# Patient Record
Sex: MALE | Race: WHITE | NOT HISPANIC OR LATINO | Employment: OTHER | URBAN - METROPOLITAN AREA
[De-identification: names, ages, dates, MRNs, and addresses within clinical notes are randomized per-mention and may not be internally consistent; named-entity substitution may affect disease eponyms.]

---

## 2018-02-01 ENCOUNTER — HOSPITAL ENCOUNTER (OUTPATIENT)
Dept: CARDIOLOGY | Facility: HOSPITAL | Age: 73
Discharge: HOME | End: 2018-02-01
Admitting: INTERNAL MEDICINE

## 2018-02-01 ENCOUNTER — APPOINTMENT (OUTPATIENT)
Dept: GENERAL RADIOLOGY | Facility: HOSPITAL | Age: 73
End: 2018-02-01

## 2018-02-01 ENCOUNTER — HOSPITAL ENCOUNTER (EMERGENCY)
Facility: HOSPITAL | Age: 73
Discharge: HOME | End: 2018-02-01
Attending: EMERGENCY MEDICINE | Admitting: EMERGENCY MEDICINE

## 2018-02-01 VITALS
SYSTOLIC BLOOD PRESSURE: 125 MMHG | RESPIRATION RATE: 16 BRPM | BODY MASS INDEX: 33.36 KG/M2 | OXYGEN SATURATION: 95 % | DIASTOLIC BLOOD PRESSURE: 76 MMHG | WEIGHT: 233 LBS | HEIGHT: 70 IN | HEART RATE: 64 BPM

## 2018-02-01 VITALS
HEIGHT: 70 IN | WEIGHT: 233 LBS | TEMPERATURE: 97.8 F | SYSTOLIC BLOOD PRESSURE: 121 MMHG | OXYGEN SATURATION: 92 % | DIASTOLIC BLOOD PRESSURE: 76 MMHG | HEART RATE: 68 BPM | BODY MASS INDEX: 33.36 KG/M2 | RESPIRATION RATE: 16 BRPM

## 2018-02-01 DIAGNOSIS — N28.9 RENAL INSUFFICIENCY: ICD-10-CM

## 2018-02-01 DIAGNOSIS — R06.09 DOE (DYSPNEA ON EXERTION): Primary | ICD-10-CM

## 2018-02-01 DIAGNOSIS — E78.5 HYPERLIPIDEMIA, UNSPECIFIED HYPERLIPIDEMIA TYPE: ICD-10-CM

## 2018-02-01 DIAGNOSIS — R06.02 SHORTNESS OF BREATH: Primary | ICD-10-CM

## 2018-02-01 DIAGNOSIS — E11.8 TYPE 2 DIABETES MELLITUS WITH COMPLICATION, WITHOUT LONG-TERM CURRENT USE OF INSULIN (HCC): ICD-10-CM

## 2018-02-01 DIAGNOSIS — R07.9 CHEST PAIN, UNSPECIFIED TYPE: ICD-10-CM

## 2018-02-01 DIAGNOSIS — R00.2 PALPITATIONS: ICD-10-CM

## 2018-02-01 DIAGNOSIS — I10 ESSENTIAL HYPERTENSION: ICD-10-CM

## 2018-02-01 LAB
ALBUMIN SERPL-MCNC: 4.4 G/DL (ref 3.5–5.2)
ALBUMIN/GLOB SERPL: 1.4 G/DL
ALP SERPL-CCNC: 81 U/L (ref 39–117)
ALT SERPL W P-5'-P-CCNC: 29 U/L (ref 1–41)
ANION GAP SERPL CALCULATED.3IONS-SCNC: 15.8 MMOL/L
AST SERPL-CCNC: 28 U/L (ref 1–40)
BASOPHILS # BLD AUTO: 0.02 10*3/MM3 (ref 0–0.2)
BASOPHILS NFR BLD AUTO: 0.3 % (ref 0–1.5)
BILIRUB SERPL-MCNC: 0.5 MG/DL (ref 0.1–1.2)
BUN BLD-MCNC: 28 MG/DL (ref 8–23)
BUN/CREAT SERPL: 19.3 (ref 7–25)
CALCIUM SPEC-SCNC: 9.4 MG/DL (ref 8.6–10.5)
CHLORIDE SERPL-SCNC: 102 MMOL/L (ref 98–107)
CO2 SERPL-SCNC: 22.2 MMOL/L (ref 22–29)
CREAT BLD-MCNC: 1.45 MG/DL (ref 0.76–1.27)
D DIMER PPP FEU-MCNC: 0.43 MCGFEU/ML (ref 0–0.49)
DEPRECATED RDW RBC AUTO: 45 FL (ref 37–54)
EOSINOPHIL # BLD AUTO: 0.13 10*3/MM3 (ref 0–0.7)
EOSINOPHIL NFR BLD AUTO: 2 % (ref 0.3–6.2)
ERYTHROCYTE [DISTWIDTH] IN BLOOD BY AUTOMATED COUNT: 13 % (ref 11.5–14.5)
GFR SERPL CREATININE-BSD FRML MDRD: 48 ML/MIN/1.73
GLOBULIN UR ELPH-MCNC: 3.1 GM/DL
GLUCOSE BLD-MCNC: 119 MG/DL (ref 65–99)
HCT VFR BLD AUTO: 44.8 % (ref 40.4–52.2)
HGB BLD-MCNC: 15 G/DL (ref 13.7–17.6)
HOLD SPECIMEN: NORMAL
HOLD SPECIMEN: NORMAL
IMM GRANULOCYTES # BLD: 0 10*3/MM3 (ref 0–0.03)
IMM GRANULOCYTES NFR BLD: 0 % (ref 0–0.5)
INR PPP: 1.09 (ref 0.9–1.1)
LYMPHOCYTES # BLD AUTO: 1.97 10*3/MM3 (ref 0.9–4.8)
LYMPHOCYTES NFR BLD AUTO: 29.9 % (ref 19.6–45.3)
MCH RBC QN AUTO: 31.6 PG (ref 27–32.7)
MCHC RBC AUTO-ENTMCNC: 33.5 G/DL (ref 32.6–36.4)
MCV RBC AUTO: 94.5 FL (ref 79.8–96.2)
MONOCYTES # BLD AUTO: 0.6 10*3/MM3 (ref 0.2–1.2)
MONOCYTES NFR BLD AUTO: 9.1 % (ref 5–12)
NEUTROPHILS # BLD AUTO: 3.87 10*3/MM3 (ref 1.9–8.1)
NEUTROPHILS NFR BLD AUTO: 58.7 % (ref 42.7–76)
NT-PROBNP SERPL-MCNC: 54 PG/ML (ref 0–900)
PLATELET # BLD AUTO: 188 10*3/MM3 (ref 140–500)
PMV BLD AUTO: 10.8 FL (ref 6–12)
POTASSIUM BLD-SCNC: 4.1 MMOL/L (ref 3.5–5.2)
PROT SERPL-MCNC: 7.5 G/DL (ref 6–8.5)
PROTHROMBIN TIME: 13.7 SECONDS (ref 11.7–14.2)
RBC # BLD AUTO: 4.74 10*6/MM3 (ref 4.6–6)
SODIUM BLD-SCNC: 140 MMOL/L (ref 136–145)
TROPONIN T SERPL-MCNC: <0.01 NG/ML (ref 0–0.03)
WBC NRBC COR # BLD: 6.59 10*3/MM3 (ref 4.5–10.7)
WHOLE BLOOD HOLD SPECIMEN: NORMAL
WHOLE BLOOD HOLD SPECIMEN: NORMAL

## 2018-02-01 PROCEDURE — 93005 ELECTROCARDIOGRAM TRACING: CPT | Performed by: NURSE PRACTITIONER

## 2018-02-01 PROCEDURE — 85025 COMPLETE CBC W/AUTO DIFF WBC: CPT | Performed by: EMERGENCY MEDICINE

## 2018-02-01 PROCEDURE — 93010 ELECTROCARDIOGRAM REPORT: CPT | Performed by: NURSE PRACTITIONER

## 2018-02-01 PROCEDURE — 85610 PROTHROMBIN TIME: CPT | Performed by: EMERGENCY MEDICINE

## 2018-02-01 PROCEDURE — 93005 ELECTROCARDIOGRAM TRACING: CPT

## 2018-02-01 PROCEDURE — 85379 FIBRIN DEGRADATION QUANT: CPT | Performed by: NURSE PRACTITIONER

## 2018-02-01 PROCEDURE — 71046 X-RAY EXAM CHEST 2 VIEWS: CPT

## 2018-02-01 PROCEDURE — 80053 COMPREHEN METABOLIC PANEL: CPT | Performed by: EMERGENCY MEDICINE

## 2018-02-01 PROCEDURE — 83880 ASSAY OF NATRIURETIC PEPTIDE: CPT | Performed by: EMERGENCY MEDICINE

## 2018-02-01 PROCEDURE — 99204 OFFICE O/P NEW MOD 45 MIN: CPT | Performed by: NURSE PRACTITIONER

## 2018-02-01 PROCEDURE — 94760 N-INVAS EAR/PLS OXIMETRY 1: CPT

## 2018-02-01 PROCEDURE — 93010 ELECTROCARDIOGRAM REPORT: CPT | Performed by: INTERNAL MEDICINE

## 2018-02-01 PROCEDURE — 99284 EMERGENCY DEPT VISIT MOD MDM: CPT

## 2018-02-01 PROCEDURE — 84484 ASSAY OF TROPONIN QUANT: CPT | Performed by: EMERGENCY MEDICINE

## 2018-02-01 RX ORDER — MEMANTINE HYDROCHLORIDE 10 MG/1
10 TABLET ORAL 2 TIMES DAILY
COMMUNITY

## 2018-02-01 RX ORDER — NITROGLYCERIN 0.4 MG/1
0.4 TABLET SUBLINGUAL
Status: DISCONTINUED | OUTPATIENT
Start: 2018-02-01 | End: 2018-02-01

## 2018-02-01 RX ORDER — SODIUM CHLORIDE 0.9 % (FLUSH) 0.9 %
10 SYRINGE (ML) INJECTION AS NEEDED
Status: DISCONTINUED | OUTPATIENT
Start: 2018-02-01 | End: 2018-02-01 | Stop reason: HOSPADM

## 2018-02-01 RX ORDER — ASPIRIN 325 MG
325 TABLET ORAL ONCE
Status: COMPLETED | OUTPATIENT
Start: 2018-02-01 | End: 2018-02-01

## 2018-02-01 RX ORDER — SODIUM CHLORIDE 0.9 % (FLUSH) 0.9 %
10 SYRINGE (ML) INJECTION AS NEEDED
Status: DISCONTINUED | OUTPATIENT
Start: 2018-02-01 | End: 2018-02-01

## 2018-02-01 RX ORDER — TRAZODONE HYDROCHLORIDE 50 MG/1
50 TABLET ORAL NIGHTLY
COMMUNITY

## 2018-02-01 RX ORDER — ATORVASTATIN CALCIUM 40 MG/1
40 TABLET, FILM COATED ORAL NIGHTLY
COMMUNITY

## 2018-02-01 RX ORDER — FLUOXETINE HYDROCHLORIDE 40 MG/1
40 CAPSULE ORAL DAILY
COMMUNITY

## 2018-02-01 RX ORDER — BUSPIRONE HYDROCHLORIDE 10 MG/1
10 TABLET ORAL 2 TIMES DAILY
COMMUNITY

## 2018-02-01 RX ORDER — AMLODIPINE BESYLATE 10 MG/1
10 TABLET ORAL DAILY
COMMUNITY

## 2018-02-01 RX ORDER — CANDESARTAN 32 MG/1
32 TABLET ORAL DAILY
COMMUNITY

## 2018-02-01 RX ADMIN — ASPIRIN 325 MG: 325 TABLET ORAL at 11:35

## 2018-02-01 NOTE — ED PROVIDER NOTES
EMERGENCY DEPARTMENT ENCOUNTER    CHIEF COMPLAINT  Chief Complaint: Abnormal EKG  History given by: patient  History limited by: nothing  Room Number: 36/36  PMD: CHAYO Kelly      HPI:  Pt is a 72 y.o. male who presents complaining of BLE edema, SOA, fatigue and palpitations for the last month. Pt also complains of intermittent, sharp, right sided CP but denies having any CP today. Pt states that his BLE edema is worse as the day progresses and that his SOA is worse with exertion. Pt denies vision issues, fever, cough. Pt denies a history of cardiac or pulmonary disease. Pt was sent to the ED from his PMD's office for further evaluation    Duration:  1 month  Onset: gradual  Timing: constant  Quality: ARAYA  Intensity/Severity: moderate  Progression: stable  Associated Symptoms: CP, leg swelling, fatigue, palpitations  Aggravating Factors: exertion  Alleviating Factors: none  Previous Episodes: none  Treatment before arrival:  Pt was sent to the ED from his PMD's office for further evaluation    PAST MEDICAL HISTORY  Active Ambulatory Problems     Diagnosis Date Noted   • No Active Ambulatory Problems     Resolved Ambulatory Problems     Diagnosis Date Noted   • No Resolved Ambulatory Problems     Past Medical History:   Diagnosis Date   • Anxiety    • BPH (benign prostatic hyperplasia)    • Cataract    • Dementia    • Depression    • Diabetes mellitus    • Hyperlipidemia    • Hypertension    • Insomnia    • Nocturia    • Renal disorder    • Retinopathy due to secondary diabetes    • Vitamin D deficiency        PAST SURGICAL HISTORY  Past Surgical History:   Procedure Laterality Date   • BONE GRAFT PELVIS ILIAC CREST     • CERVICAL LAMINECTOMY     • PROSTATE SURGERY         FAMILY HISTORY  History reviewed. No pertinent family history.    SOCIAL HISTORY  Social History     Social History   • Marital status: Single     Spouse name: N/A   • Number of children: N/A   • Years of education: N/A     Occupational  History   • Not on file.     Social History Main Topics   • Smoking status: Never Smoker   • Smokeless tobacco: Not on file   • Alcohol use Yes      Comment: SELDOM   • Drug use: No   • Sexual activity: Not on file     Other Topics Concern   • Not on file     Social History Narrative   • No narrative on file       ALLERGIES  Review of patient's allergies indicates no known allergies.    REVIEW OF SYSTEMS  Review of Systems   Constitutional: Positive for fatigue. Negative for activity change, appetite change and fever.   HENT: Negative for congestion and sore throat.    Eyes: Negative.    Respiratory: Positive for shortness of breath (ARAYA). Negative for cough.    Cardiovascular: Positive for chest pain, palpitations and leg swelling (BLE).   Gastrointestinal: Negative for abdominal pain, diarrhea and vomiting.   Endocrine: Negative.    Genitourinary: Negative for decreased urine volume and dysuria.   Musculoskeletal: Negative for neck pain.   Skin: Negative for rash and wound.   Allergic/Immunologic: Negative.    Neurological: Negative for weakness, numbness and headaches.   Hematological: Negative.    Psychiatric/Behavioral: Negative.    All other systems reviewed and are negative.      PHYSICAL EXAM  ED Triage Vitals   Temp Heart Rate Resp BP SpO2   02/01/18 1037 02/01/18 1037 02/01/18 1037 02/01/18 1059 02/01/18 1037   98.5 °F (36.9 °C) 84 15 123/80 97 %      Temp src Heart Rate Source Patient Position BP Location FiO2 (%)   02/01/18 1037 -- -- -- --   Tympanic           Physical Exam   Constitutional: He is oriented to person, place, and time and well-developed, well-nourished, and in no distress.   HENT:   Head: Normocephalic and atraumatic.   Mouth/Throat: Oropharynx is clear and moist.   Eyes: EOM are normal. Pupils are equal, round, and reactive to light.   Neck: Normal range of motion. Neck supple.   Cardiovascular: Normal rate, regular rhythm and normal heart sounds.    Pulses:       Dorsalis pedis pulses  are 2+ on the right side, and 2+ on the left side.   Pulmonary/Chest: Effort normal and breath sounds normal. No respiratory distress.   Abdominal: Soft. There is no tenderness. There is no rebound and no guarding.   Musculoskeletal: Normal range of motion. He exhibits edema (mild, BLE). He exhibits no tenderness (calf ).   Neurological: He is alert and oriented to person, place, and time. He has normal sensation and normal strength.   Skin: Skin is warm and dry.   Psychiatric: Mood and affect normal.   Nursing note and vitals reviewed.      LAB RESULTS  Lab Results (last 24 hours)     Procedure Component Value Units Date/Time    CBC & Differential [[id number]] Collected:  02/01/18 1129    Specimen:  Blood Updated:  02/01/18 1142    Narrative:       The following orders were created for panel order CBC & Differential.  Procedure                               Abnormality         Status                     ---------                               -----------         ------                     CBC Auto Differential[[id number]]        Normal              Final result                 Please view results for these tests on the individual orders.    Comprehensive Metabolic Panel [[id number]]  (Abnormal) Collected:  02/01/18 1129    Specimen:  Blood Updated:  02/01/18 1203     Glucose 119 (H) mg/dL      BUN 28 (H) mg/dL      Creatinine 1.45 (H) mg/dL      Sodium 140 mmol/L      Potassium 4.1 mmol/L      Chloride 102 mmol/L      CO2 22.2 mmol/L      Calcium 9.4 mg/dL      Total Protein 7.5 g/dL      Albumin 4.40 g/dL      ALT (SGPT) 29 U/L      AST (SGOT) 28 U/L      Alkaline Phosphatase 81 U/L      Total Bilirubin 0.5 mg/dL      eGFR Non African Amer 48 (L) mL/min/1.73      Globulin 3.1 gm/dL      A/G Ratio 1.4 g/dL      BUN/Creatinine Ratio 19.3     Anion Gap 15.8 mmol/L     Narrative:       The MDRD GFR formula is only valid for adults with stable renal function between ages 18 and 70.    Troponin [[id number]]  (Normal)  Collected:  02/01/18 1129    Specimen:  Blood Updated:  02/01/18 1205     Troponin T <0.010 ng/mL     Narrative:       Troponin T Reference Ranges:  Less than 0.03 ng/mL:    Negative for AMI  0.03 to 0.09 ng/mL:      Indeterminant for AMI  Greater than 0.09 ng/mL: Positive for AMI    Protime-INR [[id number]]  (Normal) Collected:  02/01/18 1129    Specimen:  Blood Updated:  02/01/18 1150     Protime 13.7 Seconds      INR 1.09    CBC Auto Differential [[id number]]  (Normal) Collected:  02/01/18 1129    Specimen:  Blood Updated:  02/01/18 1142     WBC 6.59 10*3/mm3      RBC 4.74 10*6/mm3      Hemoglobin 15.0 g/dL      Hematocrit 44.8 %      MCV 94.5 fL      MCH 31.6 pg      MCHC 33.5 g/dL      RDW 13.0 %      RDW-SD 45.0 fl      MPV 10.8 fL      Platelets 188 10*3/mm3      Neutrophil % 58.7 %      Lymphocyte % 29.9 %      Monocyte % 9.1 %      Eosinophil % 2.0 %      Basophil % 0.3 %      Immature Grans % 0.0 %      Neutrophils, Absolute 3.87 10*3/mm3      Lymphocytes, Absolute 1.97 10*3/mm3      Monocytes, Absolute 0.60 10*3/mm3      Eosinophils, Absolute 0.13 10*3/mm3      Basophils, Absolute 0.02 10*3/mm3      Immature Grans, Absolute 0.00 10*3/mm3     BNP [[id number]]  (Normal) Collected:  02/01/18 1129    Specimen:  Blood Updated:  02/01/18 1205     proBNP 54.0 pg/mL     Narrative:       Among patients with dyspnea, NT-proBNP is highly sensitive for the detection of acute congestive heart failure. In addition NT-proBNP of <300 pg/ml effectively rules out acute congestive heart failure with 99% negative predictive value.          I ordered the above labs and reviewed the results    RADIOLOGY  XR Chest 2 View   Final Result         No pleural effusion or vascular congestion nor lung  consolidation.  I ordered the above noted radiological studies. Interpreted by radiologist. Reviewed by me in PACS.       PROCEDURES  Procedures  EKG           EKG time: 1044  Rhythm/Rate: 69  P waves and KS: normal  QRS, axis: normal   ST  and T waves: less than 1 mm ST elevation in V3-V6     Interpreted Contemporaneously by me, independently viewed  No prior for comparison      PROGRESS AND CONSULTS  ED Course       1124-  Notified pt and family of the pt's abnormal EKG. Discussed the plan to order lab and iamging studies prior to consulting cardiology and likely admitting the pt. Pt understands and agrees with the plan, all questions answered.    1126- Ordered blood work, troponin, PT-INR, EKG, and CXR for further evaluation.    1219- Placed call to [facility] for consult.    1225- Discussed pt with Dr. Melgar (cardiology) who requested I send the patient to the office for further testing.     1229- Rechecked patient. Discussed the consult with Dr. Melgar (cardiology) and the plan to discharge the pt to [facility] for further testing. Patient agreed with the plan and would like to proceed.    MEDICAL DECISION MAKING  Results were reviewed/discussed with the patient and they were also made aware of online access. Pt also made aware that some labs, such as cultures, will not be resulted during ER visit and follow up with PMD is necessary.     MDM  Number of Diagnoses or Management Options  ARAYA (dyspnea on exertion):      Amount and/or Complexity of Data Reviewed  Clinical lab tests: ordered and reviewed (Troponin: <0.010 ng/mL)  Tests in the radiology section of CPT®: ordered and reviewed (CXR: No pleural effusion or vascular congestion nor lung consolidation.)  Tests in the medicine section of CPT®: ordered and reviewed (See EKG procedure note)  Decide to obtain previous medical records or to obtain history from someone other than the patient: yes  Review and summarize past medical records: yes (Pt saw his PMD PTA for leg swelling, SOA, fatigue, palpitations and CP for the last month. Pt's O2=90-93% on RA)  Discuss the patient with other providers: yes ( D/w Dr. Melgar (cardiology))  Independent visualization of images, tracings, or  specimens: yes           DIAGNOSIS  Final diagnoses:   ARAYA (dyspnea on exertion)       DISPOSITION  DISCHARGE    Patient discharged in stable condition.    Reviewed implications of results, diagnosis, meds, responsibility to follow up, warning signs and symptoms of possible worsening, potential complications and reasons to return to ER, including worsening CP, SOA, or any other concerns.    Patient/Family voiced understanding of above instructions.    Discussed plan for discharge, as there is no emergent indication for admission.  Pt/family is agreeable and understands need for follow up and repeat testing.  Pt is aware that discharge does not mean that nothing is wrong but it indicates no emergency is present that requires admission and they must continue care with follow-up as given below or physician of their choice.     FOLLOW-UP  [facility] CARDIOLOGY  [address]  [facility] [address] [phone]    Go directly to office now.         Medication List      Notice     No changes were made to your prescriptions during this visit.            Latest Documented Vital Signs:  As of 12:35 PM  BP- 121/76 HR- 68 Temp- 97.8 °F (36.6 °C) (Oral) O2 sat- 92%    --  Documentation assistance provided by suly Tucker and Jordy Anderson for Dr. Solis.  Information recorded by the julius was done at my direction and has been verified and validated by me.     Jordy Anderson  02/01/18 1237       Jennifer Tucker  02/01/18 3477       Arnulfo Solis MD  02/03/18 6106

## 2018-02-01 NOTE — PROGRESS NOTES
Date of Office Visit:  Encounter Provider: CHAYO Borjas  [facility] CARDIOLOGY  Cardiac Evaluation Center  Patient Name: Allen Oropeza  :1945    Chief Complaint   Patient presents with   • Chest Pain   • Shortness of Breath   :     HPI: Allen Oropeza is a 72 y.o. male who presents today for evaluation of chest pain, shortness of breath, palpitations.  He was referred by Dr. Allen Melgar.  Mr. Oropeza is a new patient to me and his previous records have been requested and reviewed.  His past medical history entails BPH, dementia, depression, anxiety, diabetes mellitus, hypertension, hyperlipidemia, and retinopathy secondary to diabetes.  He denies any history of coronary artery disease, heart failure, cardiac arrhythmias, rheumatic fever, cardiac arrhythmias.    He presented to the emergency department earlier today after he was noted to have an abnormal EKG and his PCP office he had complains of a right-sided, mild, sharp chest discomfort over the past 2 months that has woken him up at night and occurs with both exertion and nonexertional activities.  He has also been experiencing dyspnea upon minimal exertion that resolves with rest.  He's noticed some palpitations and has chronic lower extremity edema.  He also has felt fatigued and experiences tingling of his feet.  Upon review of the ED records he was afebrile, blood pressure and heart rate were both normal.,  Transient metabolic panel was normal except for elevated glucose of 119, BUN of 28, and creatinine of 1.45.  CBC, pro-BMP, and troponin were all normal.  EKG was interpreted as normal sinus rhythm with ST elevation in V3-V6.  He was recommended to be further evaluated in our Cardiac Evaluation Center today. He denies any active chest pain at this time.    The following portion of the patient's history were reviewed and updated as appropriate: past medical history, past surgical history, past social history,  past family history, allergies, current medications, and problem list.    Past Medical History:   Diagnosis Date   • Anxiety    • BPH (benign prostatic hyperplasia)    • Cataract    • Dementia    • Depression    • Diabetes mellitus    • Hyperlipidemia    • Hypertension    • Insomnia    • Nocturia    • Renal disorder    • Retinopathy due to secondary diabetes    • Vitamin D deficiency        Past Surgical History:   Procedure Laterality Date   • BONE GRAFT PELVIS ILIAC CREST     • CERVICAL LAMINECTOMY     • PROSTATE SURGERY         Social History     Social History   • Marital status: Single     Spouse name: N/A   • Number of children: N/A   • Years of education: N/A     Occupational History   • Not on file.     Social History Main Topics   • Smoking status: Never Smoker   • Smokeless tobacco: Never Used      Comment: Caffeine- not daily   • Alcohol use Yes      Comment: SELDOM   • Drug use: No   • Sexual activity: Defer     Family History   Problem Relation Age of Onset   • Heart failure Mother        Review of Systems   Constitution: Negative for chills, diaphoresis, fever, malaise/fatigue, night sweats, weight gain and weight loss.   HENT: Negative for hearing loss, nosebleeds, sore throat and tinnitus.    Eyes: Negative for blurred vision, double vision, pain and visual disturbance.   Cardiovascular: Positive for chest pain, dyspnea on exertion, leg swelling and palpitations. Negative for claudication, cyanosis, irregular heartbeat, near-syncope, orthopnea, paroxysmal nocturnal dyspnea and syncope.   Respiratory: Positive for wheezing. Negative for cough, hemoptysis, shortness of breath and snoring.    Endocrine: Negative for cold intolerance, heat intolerance and polyuria.   Hematologic/Lymphatic: Negative for bleeding problem. Does not bruise/bleed easily.   Skin: Negative for color change, dry skin, flushing and itching.   Musculoskeletal: Negative for falls, joint pain, joint swelling, muscle cramps, muscle  "weakness and myalgias.   Gastrointestinal: Negative for abdominal pain, constipation, heartburn, melena, nausea and vomiting.   Genitourinary: Negative for dysuria and hematuria.   Neurological: Positive for paresthesias (feet). Negative for excessive daytime sleepiness, dizziness, light-headedness, loss of balance, numbness, seizures and vertigo.   Psychiatric/Behavioral: Positive for depression. Negative for altered mental status, memory loss and substance abuse. The patient is nervous/anxious. The patient does not have insomnia.    Allergic/Immunologic: Negative for environmental allergies.       No Known Allergies      Current Outpatient Prescriptions:   •  amLODIPine (NORVASC) 10 MG tablet, Take 10 mg by mouth Daily., Disp: , Rfl:   •  atorvastatin (LIPITOR) 40 MG tablet, Take 40 mg by mouth Every Night., Disp: , Rfl:   •  busPIRone (BUSPAR) 10 MG tablet, Take 10 mg by mouth 2 (Two) Times a Day., Disp: , Rfl:   •  candesartan (ATACAND) 32 MG tablet, Take 32 mg by mouth Daily., Disp: , Rfl:   •  FLUoxetine (PROzac) 40 MG capsule, Take 40 mg by mouth Daily., Disp: , Rfl:   •  memantine (NAMENDA) 10 MG tablet, Take 10 mg by mouth 2 (Two) Times a Day., Disp: , Rfl:   •  metFORMIN (GLUCOPHAGE) 1000 MG tablet, Take 1,000 mg by mouth 2 (Two) Times a Day With Meals., Disp: , Rfl:   •  traZODone (DESYREL) 50 MG tablet, Take 50 mg by mouth Every Night., Disp: , Rfl:      Objective:     Vitals:    02/01/18 1303 02/01/18 1304 02/01/18 1419   BP: 120/74 125/76    BP Location: Right arm Left arm    Patient Position: Sitting Sitting    Pulse: 64     Resp: 16     SpO2: 93%  95%   Weight: 106 kg (233 lb)     Height: 177.8 cm (70\")       Body mass index is 33.43 kg/(m^2).    PHYSICAL EXAM:    Vitals Reviewed.   General Appearance: No acute distress, well developed and well nourished. Obese.   Eyes: Conjunctiva and lids: No erythema, swelling, or discharge. Sclera non-icteric.   HENT: Atraumatic, normocephalic. External eyes, " ears, and nose normal. No hearing loss noted. Mucous membranes normal. Lips not cyanotic. Neck supple with no tenderness.  Respiratory: No signs of respiratory distress. Respiration rhythm and depth normal.   Clear to auscultation. No rales, crackles, rhonchi, or wheezing auscultated.   Cardiovascular:  Jugular Venous Pressure: Normal  Heart Rate and Rhythm: Normal, Heart Sounds: Normal S1 and S2. No S3 or S4 noted.  Murmurs: No murmurs noted. No rubs, thrills, or gallops.   Arterial Pulses: Carotid pulses normal. No carotid bruit noted. Posterior tibialis and dorsalis pedis pulses normal.   Lower Extremities: No edema noted.  Gastrointestinal:  Abdomen soft, non-distended, non-tender. Normal bowel sounds. No hepatomegaly.   Musculoskeletal: Normal movement of extremities  Skin and Nails: General appearance normal. No pallor, cyanosis, diaphoresis. Skin temperature normal. No clubbing of fingernails.   Psychiatric: Patient alert and oriented to person, place, and time. Speech and behavior appropriate. Normal mood and affect.       ECG 12 Lead  Date/Time: 2/1/2018 2:06 PM  Performed by: MAKENNA DARBY  Authorized by: MAKENNA DARBY   Comparison: compared with previous ECG from 2/1/2018  Similar to previous ECG  Rhythm: sinus rhythm  Rate: normal  BPM: 64  Conduction: conduction normal  ST Segments: ST segments normal  T Waves: T waves normal  QRS axis: normal  Other findings: PRWP  Clinical impression: non-specific ECG              Hospital Blood Work:     Results from last 7 days  Lab Units 02/01/18  1129   SODIUM mmol/L 140   POTASSIUM mmol/L 4.1   CHLORIDE mmol/L 102   CO2 mmol/L 22.2   BUN mg/dL 28*   CREATININE mg/dL 1.45*   CALCIUM mg/dL 9.4   BILIRUBIN mg/dL 0.5   ALK PHOS U/L 81   ALT (SGPT) U/L 29   AST (SGOT) U/L 28   GLUCOSE mg/dL 119*       Results from last 7 days  Lab Units 02/01/18  1129   WBC 10*3/mm3 6.59   HEMOGLOBIN g/dL 15.0   HEMATOCRIT % 44.8   PLATELETS 10*3/mm3 188             Assessment:        Diagnosis Plan   1. Shortness of breath  Adult Transthoracic Echo Complete W/ Cont if Necessary Per Protocol    Stress Test With Myocardial Perfusion One Day    Holter Monitor - 24 Hour   2. Chest pain, unspecified type  Adult Transthoracic Echo Complete W/ Cont if Necessary Per Protocol    Stress Test With Myocardial Perfusion One Day    Holter Monitor - 24 Hour   3. Palpitations  Holter Monitor - 24 Hour   4. Renal insufficiency     5. Essential hypertension     6. Hyperlipidemia, unspecified hyperlipidemia type     7. Type 2 diabetes mellitus with complication, without long-term current use of insulin            Plan:       1. Chest Pain & Shortness of Breath:  I recommended a D-dimer be obtained which was normal.  The patient said he became very dyspneic upon exertion so he walked him around the unit.  His heart rate was 95 and his oxygen saturation was 95%.  There were no abnormalities noted on the telemetry monitor.  His repeat EKG showed normal sinus rhythm with poor R-wave progression.  I've recommended a Lexiscan Myoview stress test and 2-D echocardiogram to be completed.  His risk factors for coronary artery disease include hyperlipidemia, hypertension, and diabetes mellitus.  I will plan to follow up with the patient the results of his testing.     2. Palpitations: I have recommended a 24 Holter monitor to be completed.     3. Renal Insufficiency: BUN and creatinine are elevated as stated above and I will defer to PCP. He may also be dehydrated today.     4. Essential Hypertension: Blood pressure is well controlled on current medication regimen.     5. Hyperlipidemia: He is on a statin and cholesterol is managed by his PCP.     6. Diabetes Mellitus: I have recommended good blood pressure control.     Patient was seen and dictated independently by CHAYO Gamboa.  As always, it has been a pleasure to participate in your patient's care.      Sincerely,         HCAYO Gamboa

## 2018-02-01 NOTE — ED NOTES
"Patient states \"I've been short of breath with exertion for about a month now with episodes of sharp chest pain that comes and goes. I went to see my PCP today and they did an EKG and told me to come to the ER.\" Patient currently denies CP or SOA.     Francisca Benavides RN  02/01/18 1131    "

## 2018-02-01 NOTE — PATIENT INSTRUCTIONS
Shortness of Breath, Adult  Shortness of breath is when a person has trouble breathing enough air, or when a person feels like she or he is having trouble breathing in enough air. Shortness of breath could be a sign of medical problem.  Follow these instructions at home:  Pay attention to any changes in your symptoms. Take these actions to help with your condition:  · Do not smoke. Smoking is a common cause of shortness of breath. If you smoke and you need help quitting, ask your health care provider.  · Avoid things that can irritate your airways, such as:  ¨ Mold.  ¨ Dust.  ¨ Air pollution.  ¨ Chemical fumes.  ¨ Things that can cause allergy symptoms (allergens), if you have allergies.  · Keep your living space clean and free of mold and dust.  · Rest as needed. Slowly return to your usual activities.  · Take over-the-counter and prescription medicines, including oxygen and inhaled medicines, only as told by your health care provider.  · Keep all follow-up visits as told by your health care provider. This is important.  Contact a health care provider if:  · Your condition does not improve as soon as expected.  · You have a hard time doing your normal activities, even after you rest.  · You have new symptoms.  Get help right away if:  · Your shortness of breath gets worse.  · You have shortness of breath when you are resting.  · You feel light-headed or you faint.  · You have a cough that is not controlled with medicines.  · You cough up blood.  · You have pain with breathing.  · You have pain in your chest, arms, shoulders, or abdomen.  · You have a fever.  · You cannot walk up stairs or exercise the way that you normally do.  This information is not intended to replace advice given to you by your health care provider. Make sure you discuss any questions you have with your health care provider.  Document Released: 09/12/2002 Document Revised: 07/08/2017 Document Reviewed: 05/25/2017  [employer] [facility]  Education © 2017 [employer]

## 2018-02-01 NOTE — PROGRESS NOTES
"Right chest discomfort for several months, increasing in frequency. Not a daily occurrence. \"Sharp\", episodes last a minute, occur with rest and exertion. Severe SOA with minimal exertion. Rare dizziness. No N/V, fever or diaphoresis. To PCP, referred to ED. To [facility] for further testing.     72 y.o.    177.8 cm (70\") 106 kg (233 lb)    Review of patient's allergies indicates no known allergies.    CHAYO Kelly    Chief Complaint   Patient presents with   • Chest Pain       Past Medical History:   Diagnosis Date   • Anxiety    • BPH (benign prostatic hyperplasia)    • Cataract    • Dementia    • Depression    • Diabetes mellitus    • Hyperlipidemia    • Hypertension    • Insomnia    • Nocturia    • Renal disorder    • Retinopathy due to secondary diabetes    • Vitamin D deficiency        Past Surgical History:   Procedure Laterality Date   • BONE GRAFT PELVIS ILIAC CREST     • CERVICAL LAMINECTOMY     • PROSTATE SURGERY         Social History     Social History   • Marital status: Single     Spouse name: N/A   • Number of children: N/A   • Years of education: N/A     Social History Main Topics   • Smoking status: Never Smoker   • Smokeless tobacco: Never Used      Comment: Caffeine- not daily   • Alcohol use Yes      Comment: SELDOM   • Drug use: No   • Sexual activity: Defer     Other Topics Concern   • None     Social History Narrative       Family History   Problem Relation Age of Onset   • Heart failure Mother        Vitals:    02/01/18 1303 02/01/18 1304   BP: 120/74 125/76   BP Location: Right arm Left arm   Patient Position: Sitting Sitting   Pulse: 64    Resp: 16    SpO2: 93%    Weight: 106 kg (233 lb)    Height: 177.8 cm (70\")          Current Outpatient Prescriptions:   •  amLODIPine (NORVASC) 10 MG tablet, Take 10 mg by mouth Daily., Disp: , Rfl:   •  atorvastatin (LIPITOR) 40 MG tablet, Take 40 mg by mouth Every Night., Disp: , Rfl:   •  busPIRone (BUSPAR) 10 MG tablet, Take 10 mg by mouth 2 (Two) " Times a Day., Disp: , Rfl:   •  candesartan (ATACAND) 32 MG tablet, Take 32 mg by mouth Daily., Disp: , Rfl:   •  FLUoxetine (PROzac) 40 MG capsule, Take 40 mg by mouth Daily., Disp: , Rfl:   •  memantine (NAMENDA) 10 MG tablet, Take 10 mg by mouth 2 (Two) Times a Day., Disp: , Rfl:   •  metFORMIN (GLUCOPHAGE) 1000 MG tablet, Take 1,000 mg by mouth 2 (Two) Times a Day With Meals., Disp: , Rfl:   •  traZODone (DESYREL) 50 MG tablet, Take 50 mg by mouth Every Night., Disp: , Rfl:     Current Facility-Administered Medications:   •  nitroglycerin (NITROSTAT) SL tablet 0.4 mg, 0.4 mg, Sublingual, Q5 Min PRN, Pura Romero, APRN  •  sodium chloride 0.9 % flush 10 mL, 10 mL, Intravenous, PRN, Pura Romero, APRN    Facility-Administered Medications Ordered in Other Encounters:   •  sodium chloride 0.9 % flush 10 mL, 10 mL, Intravenous, PRN, Arnulfo Solis MD

## 2018-02-02 ENCOUNTER — HOSPITAL ENCOUNTER (OUTPATIENT)
Dept: CARDIOLOGY | Facility: HOSPITAL | Age: 73
Discharge: HOME | End: 2018-02-02
Admitting: NURSE PRACTITIONER

## 2018-02-02 VITALS — HEIGHT: 70 IN | BODY MASS INDEX: 33.36 KG/M2 | HEART RATE: 67 BPM | WEIGHT: 233 LBS

## 2018-02-02 DIAGNOSIS — R06.02 SHORTNESS OF BREATH: ICD-10-CM

## 2018-02-02 DIAGNOSIS — R07.9 CHEST PAIN, UNSPECIFIED TYPE: ICD-10-CM

## 2018-02-02 LAB
ASCENDING AORTA: 3 CM
BH CV ECHO MEAS - ACS: 1.6 CM
BH CV ECHO MEAS - AI DEC SLOPE: 210.2 CM/SEC^2
BH CV ECHO MEAS - AI MAX PG: 79.3 MMHG
BH CV ECHO MEAS - AI MAX VEL: 445.2 CM/SEC
BH CV ECHO MEAS - AI P1/2T: 620.5 MSEC
BH CV ECHO MEAS - AO MAX PG (FULL): 7.9 MMHG
BH CV ECHO MEAS - AO MAX PG: 11.5 MMHG
BH CV ECHO MEAS - AO MEAN PG (FULL): 5.3 MMHG
BH CV ECHO MEAS - AO MEAN PG: 7.4 MMHG
BH CV ECHO MEAS - AO ROOT AREA (BSA CORRECTED): 1.5
BH CV ECHO MEAS - AO ROOT AREA: 9.2 CM^2
BH CV ECHO MEAS - AO ROOT DIAM: 3.4 CM
BH CV ECHO MEAS - AO V2 MAX: 169.4 CM/SEC
BH CV ECHO MEAS - AO V2 MEAN: 129.4 CM/SEC
BH CV ECHO MEAS - AO V2 VTI: 38.3 CM
BH CV ECHO MEAS - AVA(I,A): 1.5 CM^2
BH CV ECHO MEAS - AVA(I,D): 1.5 CM^2
BH CV ECHO MEAS - AVA(V,A): 1.6 CM^2
BH CV ECHO MEAS - AVA(V,D): 1.6 CM^2
BH CV ECHO MEAS - BSA(HAYCOCK): 2.3 M^2
BH CV ECHO MEAS - BSA: 2.2 M^2
BH CV ECHO MEAS - BZI_BMI: 33.4 KILOGRAMS/M^2
BH CV ECHO MEAS - BZI_METRIC_HEIGHT: 177.8 CM
BH CV ECHO MEAS - BZI_METRIC_WEIGHT: 105.7 KG
BH CV ECHO MEAS - CONTRAST EF (2CH): 63.3 ML/M^2
BH CV ECHO MEAS - CONTRAST EF 4CH: 57.8 ML/M^2
BH CV ECHO MEAS - EDV(MOD-SP2): 90 ML
BH CV ECHO MEAS - EDV(MOD-SP4): 109 ML
BH CV ECHO MEAS - EDV(TEICH): 165.1 ML
BH CV ECHO MEAS - EF(CUBED): 77 %
BH CV ECHO MEAS - EF(MOD-SP2): 63.3 %
BH CV ECHO MEAS - EF(MOD-SP4): 57.8 %
BH CV ECHO MEAS - EF(TEICH): 68.3 %
BH CV ECHO MEAS - ESV(MOD-SP2): 33 ML
BH CV ECHO MEAS - ESV(MOD-SP4): 46 ML
BH CV ECHO MEAS - ESV(TEICH): 52.3 ML
BH CV ECHO MEAS - FS: 38.7 %
BH CV ECHO MEAS - IVS/LVPW: 1
BH CV ECHO MEAS - IVSD: 1.1 CM
BH CV ECHO MEAS - LAT PEAK E' VEL: 10 CM/SEC
BH CV ECHO MEAS - LV DIASTOLIC VOL/BSA (35-75): 48.9 ML/M^2
BH CV ECHO MEAS - LV MASS(C)D: 268.5 GRAMS
BH CV ECHO MEAS - LV MASS(C)DI: 120.6 GRAMS/M^2
BH CV ECHO MEAS - LV MAX PG: 3.5 MMHG
BH CV ECHO MEAS - LV MEAN PG: 2.1 MMHG
BH CV ECHO MEAS - LV SYSTOLIC VOL/BSA (12-30): 20.7 ML/M^2
BH CV ECHO MEAS - LV V1 MAX: 94.1 CM/SEC
BH CV ECHO MEAS - LV V1 MEAN: 68 CM/SEC
BH CV ECHO MEAS - LV V1 VTI: 20.1 CM
BH CV ECHO MEAS - LVIDD: 5.8 CM
BH CV ECHO MEAS - LVIDS: 3.5 CM
BH CV ECHO MEAS - LVLD AP2: 8.5 CM
BH CV ECHO MEAS - LVLD AP4: 8.4 CM
BH CV ECHO MEAS - LVLS AP2: 6.6 CM
BH CV ECHO MEAS - LVLS AP4: 6.9 CM
BH CV ECHO MEAS - LVOT AREA (M): 2.8 CM^2
BH CV ECHO MEAS - LVOT AREA: 2.8 CM^2
BH CV ECHO MEAS - LVOT DIAM: 1.9 CM
BH CV ECHO MEAS - LVPWD: 1.1 CM
BH CV ECHO MEAS - MED PEAK E' VEL: 7 CM/SEC
BH CV ECHO MEAS - MV A DUR: 0.13 SEC
BH CV ECHO MEAS - MV A MAX VEL: 83.4 CM/SEC
BH CV ECHO MEAS - MV DEC SLOPE: 277.6 CM/SEC^2
BH CV ECHO MEAS - MV DEC TIME: 0.22 SEC
BH CV ECHO MEAS - MV E MAX VEL: 61.1 CM/SEC
BH CV ECHO MEAS - MV E/A: 0.73
BH CV ECHO MEAS - MV MAX PG: 3.9 MMHG
BH CV ECHO MEAS - MV MEAN PG: 1.8 MMHG
BH CV ECHO MEAS - MV P1/2T MAX VEL: 65 CM/SEC
BH CV ECHO MEAS - MV P1/2T: 68.6 MSEC
BH CV ECHO MEAS - MV V2 MAX: 98.8 CM/SEC
BH CV ECHO MEAS - MV V2 MEAN: 62.9 CM/SEC
BH CV ECHO MEAS - MV V2 VTI: 28.3 CM
BH CV ECHO MEAS - MVA P1/2T LCG: 3.4 CM^2
BH CV ECHO MEAS - MVA(P1/2T): 3.2 CM^2
BH CV ECHO MEAS - MVA(VTI): 2 CM^2
BH CV ECHO MEAS - PA MAX PG (FULL): 3.6 MMHG
BH CV ECHO MEAS - PA MAX PG: 5.2 MMHG
BH CV ECHO MEAS - PA V2 MAX: 114 CM/SEC
BH CV ECHO MEAS - PULM A REVS DUR: 0.11 SEC
BH CV ECHO MEAS - PULM A REVS VEL: 28.6 CM/SEC
BH CV ECHO MEAS - PULM DIAS VEL: 34.8 CM/SEC
BH CV ECHO MEAS - PULM S/D: 1.4
BH CV ECHO MEAS - PULM SYS VEL: 48.6 CM/SEC
BH CV ECHO MEAS - PVA(V,A): 2.5 CM^2
BH CV ECHO MEAS - PVA(V,D): 2.5 CM^2
BH CV ECHO MEAS - QP/QS: 1
BH CV ECHO MEAS - RV MAX PG: 1.6 MMHG
BH CV ECHO MEAS - RV MEAN PG: 0.89 MMHG
BH CV ECHO MEAS - RV V1 MAX: 64 CM/SEC
BH CV ECHO MEAS - RV V1 MEAN: 42.5 CM/SEC
BH CV ECHO MEAS - RV V1 VTI: 12.9 CM
BH CV ECHO MEAS - RVOT AREA: 4.4 CM^2
BH CV ECHO MEAS - RVOT DIAM: 2.4 CM
BH CV ECHO MEAS - SI(AO): 158.1 ML/M^2
BH CV ECHO MEAS - SI(CUBED): 66.7 ML/M^2
BH CV ECHO MEAS - SI(LVOT): 25.4 ML/M^2
BH CV ECHO MEAS - SI(MOD-SP2): 25.6 ML/M^2
BH CV ECHO MEAS - SI(MOD-SP4): 28.3 ML/M^2
BH CV ECHO MEAS - SI(TEICH): 50.6 ML/M^2
BH CV ECHO MEAS - SV(AO): 352.1 ML
BH CV ECHO MEAS - SV(CUBED): 148.5 ML
BH CV ECHO MEAS - SV(LVOT): 56.5 ML
BH CV ECHO MEAS - SV(MOD-SP2): 57 ML
BH CV ECHO MEAS - SV(MOD-SP4): 63 ML
BH CV ECHO MEAS - SV(RVOT): 56.2 ML
BH CV ECHO MEAS - SV(TEICH): 112.8 ML
BH CV ECHO MEAS - TAPSE (>1.6): 2.2 CM2
BH CV NUCLEAR PRIOR STUDY: 3
BH CV STRESS BP STAGE 1: NORMAL
BH CV STRESS COMMENTS STAGE 1: NORMAL
BH CV STRESS DOSE REGADENOSON STAGE 1: 0.4
BH CV STRESS DURATION MIN STAGE 1: 0
BH CV STRESS DURATION SEC STAGE 1: 15
BH CV STRESS HR STAGE 1: 95
BH CV STRESS PROTOCOL 1: NORMAL
BH CV STRESS RECOVERY BP: NORMAL MMHG
BH CV STRESS RECOVERY HR: 77 BPM
BH CV STRESS STAGE 1: 1
BH CV XLRA - RV BASE: 2.9 CM
BH CV XLRA - TDI S': 13 CM/SEC
E/E' RATIO: 7.5
LEFT ATRIUM VOLUME INDEX: 20 ML/M2
LV EF 2D ECHO EST: 58 %
LV EF NUC BP: 47 %
MAXIMAL PREDICTED HEART RATE: 148 BPM
PERCENT MAX PREDICTED HR: 64.19 %
SINUS: 2.9 CM
STJ: 3.2 CM
STRESS BASELINE BP: NORMAL MMHG
STRESS BASELINE HR: 73 BPM
STRESS PERCENT HR: 76 %
STRESS POST EXERCISE DUR SEC: 15 SEC
STRESS POST PEAK BP: NORMAL MMHG
STRESS POST PEAK HR: 95 BPM
STRESS TARGET HR: 126 BPM

## 2018-02-02 PROCEDURE — 25010000002 REGADENOSON 0.4 MG/5ML SOLUTION: Performed by: NURSE PRACTITIONER

## 2018-02-02 PROCEDURE — 0 TECHNETIUM TETROFOSMIN KIT: Performed by: NURSE PRACTITIONER

## 2018-02-02 PROCEDURE — 93306 TTE W/DOPPLER COMPLETE: CPT

## 2018-02-02 PROCEDURE — 78452 HT MUSCLE IMAGE SPECT MULT: CPT | Performed by: INTERNAL MEDICINE

## 2018-02-02 PROCEDURE — 78452 HT MUSCLE IMAGE SPECT MULT: CPT

## 2018-02-02 PROCEDURE — 93016 CV STRESS TEST SUPVJ ONLY: CPT | Performed by: INTERNAL MEDICINE

## 2018-02-02 PROCEDURE — 93017 CV STRESS TEST TRACING ONLY: CPT

## 2018-02-02 PROCEDURE — 93306 TTE W/DOPPLER COMPLETE: CPT | Performed by: INTERNAL MEDICINE

## 2018-02-02 PROCEDURE — 25010000002 PERFLUTREN (DEFINITY) 8.476 MG IN SODIUM CHLORIDE 0.9 % 10 ML INJECTION: Performed by: NURSE PRACTITIONER

## 2018-02-02 PROCEDURE — 93018 CV STRESS TEST I&R ONLY: CPT | Performed by: INTERNAL MEDICINE

## 2018-02-02 PROCEDURE — A9502 TC99M TETROFOSMIN: HCPCS | Performed by: NURSE PRACTITIONER

## 2018-02-02 RX ADMIN — TETROFOSMIN 1 DOSE: 1.38 INJECTION, POWDER, LYOPHILIZED, FOR SOLUTION INTRAVENOUS at 12:15

## 2018-02-02 RX ADMIN — PERFLUTREN 1.5 ML: 6.52 INJECTION, SUSPENSION INTRAVENOUS at 11:15

## 2018-02-02 RX ADMIN — TETROFOSMIN 1 DOSE: 1.38 INJECTION, POWDER, LYOPHILIZED, FOR SOLUTION INTRAVENOUS at 11:25

## 2018-02-02 RX ADMIN — REGADENOSON 0.4 MG: 0.08 INJECTION, SOLUTION INTRAVENOUS at 12:15

## 2018-02-06 ENCOUNTER — TELEPHONE (OUTPATIENT)
Dept: CARDIOLOGY | Facility: CLINIC | Age: 73
End: 2018-02-06

## 2018-02-06 NOTE — TELEPHONE ENCOUNTER
Echocardiogram Results:    · Left ventricular systolic function is normal. Estimated EF = 58%.  · Left ventricular wall thickness is consistent with mild concentric hypertrophy.  · Left ventricular diastolic dysfunction (grade I) consistent with impaired relaxation.  · Aortic valve sclerosis  · Trace to mild aortic valve regurgitation    Stress Test Results:    · The study is somewhat difficult, with mismatched defects noted in the inferior wall. As wall motion is normal, this is likely due to attenuation. No ischemia is seen.  · Left ventricular ejection fraction is mildly reduced (Calculated EF = 47%).    I was waiting to call the patient until the Holter monitor return but they're still pending.      Patient informed about echo and stress test results. I have recommended good BP control.

## 2018-02-06 NOTE — TELEPHONE ENCOUNTER
----- Message from CHAYO Scott sent at 2/6/2018  2:31 PM EST -----    Follow up on echo, nuclear, and holter

## 2018-02-08 ENCOUNTER — TELEPHONE (OUTPATIENT)
Dept: CARDIOLOGY | Facility: HOSPITAL | Age: 73
End: 2018-02-08

## 2018-02-09 ENCOUNTER — TELEPHONE (OUTPATIENT)
Dept: CARDIOLOGY | Facility: CLINIC | Age: 73
End: 2018-02-09

## 2018-02-09 NOTE — TELEPHONE ENCOUNTER
Holter Results:    Study Description  Monitor hooked-up on 2/2/2018 at 13:25 EST. The monitor was scanned on 2/7/2018. The patient was monitored for 1 days 23 hours and 59 minutes. Indications for this exam include shortness of breath, chest pain, and palpitations . Total beats: 524994. Average HR: 71. Min HR: 53. Max HR: 103.   Study Findings  Patient diary submitted.No symptoms reported during the monitoring period. The predominant rhythm noted during the testing period was sinus rhythm. Premature atrial contractions occured rarely. There was no evidence of atrial arrhythmias. There were no episodes of supraventricular tachycardia. Premature ventricular contractions occured rarely. The PVCs averaged approximately 3 per hour. They were all unifocal. There were no episodes of ventricular tachycardia. Sinoatrial node conduction was normal. No atrioventricular block noted.   Study Impressions  A relatively benign monitor study.     I spoke with patient about holter rsults. He feels fine. He will follow up with his PCP>